# Patient Record
(demographics unavailable — no encounter records)

---

## 2024-10-29 NOTE — HISTORY OF PRESENT ILLNESS
[FreeTextEntry1] : Ms. ROSIE NEWSOME  is a 40 year old  female who has a past medical history significant for Hyperlipidemia, Obesity, Severe HTG who presents to the office for a follow up evaluation. Patient feels generally well today. Denies SOB, chest pain, palpitations, dizziness, and syncope.    Blood work on 8/2024 demonstrated: Triglycerides: 1388 Total Cholesterol: 293 HDL: 27 LDL: unable to calc Non-HDL: 266 ================ ================ 11/2024 Quoc 10 Fenofibrate On Wegovy by other Doctor Saw dietician

## 2024-10-29 NOTE — DISCUSSION/SUMMARY
[FreeTextEntry1] : Hyperlipidemia, Obesity, Severe HTG   - Patient to  - A detailed discussion took place about the importance of CV risk reduction and LDL lowering. Goal < 70. - The patient understands the importance of incorporating moderate aerobic exercise, 4 times/week for 40 minutes to reduce risk of CV disease. - A detailed discussion of lifestyle modification was done today. Patient understands the importance of a heart healthy diet and incorporating veggies/legumes/fruits/whole grains and limiting processed foods, sugars and carbs in their diet. To maintain hydration with water intake throughout the day. Remove/limit sugary beverages from diet - patient understands that stress reduction along with good sleep hygiene will help aid in weight loss - Follow up in 4 weeks   - The patient has a good understanding of the diagnosis, treatment plan and lifestyle modification. she will contact me at the office for any questions with their care or any changes in their health status.   Case discussed with Dr Sergio Larkin

## 2025-03-30 NOTE — ASSESSMENT
[FreeTextEntry1] : follow up  Obesity (BMI 40.94) Weight management, Healthy food choices, and increased physical activity discussed. Zepbound not covered by insurance we'll check labs today referred to bariatric clinic  Hx of anxiety/bipolar affective disorder cont Seroquel 200mg qhs cont Hydroxyzine 25mg/10mg qhs prn following with psychiatry  elevated cholesterol with high triglycerides pt reports stopping her medications restart rosuvastatin 10mg qhs prn - renewed today restart omega 3 capsules, 2 capsules BID - renewed today we'll check lipids and LFT's today  Asthma cont Albuterol inhaler prn  Blood work ordered. Follow up in one week for lab results

## 2025-03-30 NOTE — HISTORY OF PRESENT ILLNESS
[de-identified] :  Ms. ROSIE NEWSOME is a 40 year old female with hx of E is a 40 year old female with Hx of bipolar affective disorder, asthma, anxiety, Hx of cholecystectomy, presenting for a follow up visit.  Pt c/o eating a lot and gaining weight due to Seroqouel. Pt reports Zepbound being rejected by insurance. Pt has seen a nutritionist in the past but says did not work. Denies any SOB, CP, abdominal pain, N/V/D, headache, dizziness, or leg swelling. Pt reports stopping her cholesterol medicine.

## 2025-03-30 NOTE — PHYSICAL EXAM
[No Acute Distress] : no acute distress [Well Nourished] : well nourished [Well Developed] : well developed [Well-Appearing] : well-appearing [Normal Sclera/Conjunctiva] : normal sclera/conjunctiva [Normal Outer Ear/Nose] : the outer ears and nose were normal in appearance [Normal TMs] : both tympanic membranes were normal [No JVD] : no jugular venous distention [No Lymphadenopathy] : no lymphadenopathy [Supple] : supple [No Respiratory Distress] : no respiratory distress  [No Accessory Muscle Use] : no accessory muscle use [Clear to Auscultation] : lungs were clear to auscultation bilaterally [Normal Rate] : normal rate  [Regular Rhythm] : with a regular rhythm [Normal S1, S2] : normal S1 and S2 [No Murmur] : no murmur heard [No Edema] : there was no peripheral edema [No Extremity Clubbing/Cyanosis] : no extremity clubbing/cyanosis [Soft] : abdomen soft [Non Tender] : non-tender [Non-distended] : non-distended [Normal Posterior Cervical Nodes] : no posterior cervical lymphadenopathy [Normal Anterior Cervical Nodes] : no anterior cervical lymphadenopathy [No CVA Tenderness] : no CVA  tenderness [No Spinal Tenderness] : no spinal tenderness [No Joint Swelling] : no joint swelling [Grossly Normal Strength/Tone] : grossly normal strength/tone [No Rash] : no rash [Coordination Grossly Intact] : coordination grossly intact [No Focal Deficits] : no focal deficits [Normal Gait] : normal gait [Normal Affect] : the affect was normal [Normal Insight/Judgement] : insight and judgment were intact [Normal Bowel Sounds] : normal bowel sounds

## 2025-03-30 NOTE — ADDENDUM
[FreeTextEntry1] : Documented by Jase Rolle acting as a scribe for Dr. Ludy Wilkerson. 03/25/2025  All medical records entries made by the scribe were at my, Dr. Wilkerson, direction and personally dictated by me on 03/25/2025. I have reviewed the chart and agree that the record accurately reflects my personal performance of the history, physical exam, assessment and plan. I have also personally directed, reviewed, and agreed with the chart.

## 2025-03-30 NOTE — HISTORY OF PRESENT ILLNESS
[de-identified] :  Ms. ROSIE NEWSOME is a 40 year old female with hx of E is a 40 year old female with Hx of bipolar affective disorder, asthma, anxiety, Hx of cholecystectomy, presenting for a follow up visit.  Pt c/o eating a lot and gaining weight due to Seroqouel. Pt reports Zepbound being rejected by insurance. Pt has seen a nutritionist in the past but says did not work. Denies any SOB, CP, abdominal pain, N/V/D, headache, dizziness, or leg swelling. Pt reports stopping her cholesterol medicine.

## 2025-06-07 NOTE — ADDENDUM
[FreeTextEntry1] : Documented by Jase Rolle acting as a scribe for Dr. Ludy Wilkerson. 06/04/2025  All medical records entries made by the scribe were at my, Dr. Wilkerson, direction and personally dictated by me on 06/04/2025. I have reviewed the chart and agree that the record accurately reflects my personal performance of the history, physical exam, assessment and plan. I have also personally directed, reviewed, and agreed with the chart.

## 2025-06-07 NOTE — ASSESSMENT
[FreeTextEntry1] : follow up  Obesity start monjuaro 2.5mg weekly Weight management, Healthy food choices, and increased physical activity discussed. referred to bariatric clinic  Hx of anxiety/bipolar affective disorder cont Seroquel 200mg qhs cont Hydroxyzine 25mg/10mg qhs prn following with psychiatry  Asthma cont Albuterol inhaler prn  lab results reviewed and discussed with patient  HLD - triglycerides 1167mg/dL, cholesterol 310mg/dL, unable to calculate LDL pt admits to not taking rosuvastatin or fish oil tablets restart rosuvastatin 10mg qhs prn -- renewed today start fenofibrate 145mg daily - Rx sent to pharmacy stressed importance of medication and diet compliance  abnormal CBC - elevated serum ferritin referred to hematology  high risk for DM - A1C 6.4 discussed the importance of following a low carb/low sugar diet

## 2025-06-07 NOTE — HISTORY OF PRESENT ILLNESS
[Home] : at home, [unfilled] , at the time of the visit. [Medical Office: ([facility])___] : at the medical office located in  [Telehealth (audio & video)] : This visit was provided via telehealth using real-time 2-way audio visual technology. [Verbal consent obtained from patient] : the patient, [unfilled] [de-identified] : Ms. ROSIE NEWSOME is a 41 year old female with hx of bipolar affective disorder, asthma, anxiety, cholecystectomy, seen in telemedicine for a follow up visit to discuss lab results   Pt states she is feeling well. Offers no complaints. Denies any SOB, CP, abdominal pain, N/V/D, headache, dizziness, or leg swelling.

## 2025-06-07 NOTE — HISTORY OF PRESENT ILLNESS
[Home] : at home, [unfilled] , at the time of the visit. [Medical Office: ([facility])___] : at the medical office located in  [Telehealth (audio & video)] : This visit was provided via telehealth using real-time 2-way audio visual technology. [Verbal consent obtained from patient] : the patient, [unfilled] [de-identified] : Ms. ROSIE NEWSOME is a 41 year old female with hx of bipolar affective disorder, asthma, anxiety, cholecystectomy, seen in telemedicine for a follow up visit to discuss lab results   Pt states she is feeling well. Offers no complaints. Denies any SOB, CP, abdominal pain, N/V/D, headache, dizziness, or leg swelling.

## 2025-06-30 NOTE — ASSESSMENT
[FreeTextEntry1] : follow up  Obesity increase monjuaro to 5mg weekly - renewed today Weight management, Healthy food choices, and increased physical activity discussed.  Hx of anxiety/bipolar affective disorder cont Seroquel 200mg qhs cont Hydroxyzine 25mg/10mg qhs prn following with psychiatry  Asthma cont Albuterol inhaler prn  high serum ferritin has not followed up with hematology-stressed importance of following up we'll check cbc and iron studies today  HLD cont rosuvastatin 10mg qhs prn cont fenofibrate 145mg daily reinforced the importance of following a low cholesterol/low fat diet we'll check lipids and LFT's today  high risk for DM reinforced the importance of following a low carb/low sugar diet we'll check glucose and HbA1c today   bloodwork ordered follow up in one week for lab results

## 2025-06-30 NOTE — PHYSICAL EXAM
[No Acute Distress] : no acute distress [Well-Appearing] : well-appearing [Normal Sclera/Conjunctiva] : normal sclera/conjunctiva [Normal Outer Ear/Nose] : the outer ears and nose were normal in appearance [Normal TMs] : both tympanic membranes were normal [No JVD] : no jugular venous distention [No Lymphadenopathy] : no lymphadenopathy [Supple] : supple [No Respiratory Distress] : no respiratory distress  [No Accessory Muscle Use] : no accessory muscle use [Clear to Auscultation] : lungs were clear to auscultation bilaterally [Normal Rate] : normal rate  [Regular Rhythm] : with a regular rhythm [Normal S1, S2] : normal S1 and S2 [No Murmur] : no murmur heard [No Edema] : there was no peripheral edema [Soft] : abdomen soft [Non Tender] : non-tender [Non-distended] : non-distended [Normal Bowel Sounds] : normal bowel sounds [Normal Posterior Cervical Nodes] : no posterior cervical lymphadenopathy [Normal Anterior Cervical Nodes] : no anterior cervical lymphadenopathy [No CVA Tenderness] : no CVA  tenderness [No Joint Swelling] : no joint swelling [No Rash] : no rash [Coordination Grossly Intact] : coordination grossly intact [No Focal Deficits] : no focal deficits [Normal Gait] : normal gait [Normal Affect] : the affect was normal [Normal Insight/Judgement] : insight and judgment were intact

## 2025-06-30 NOTE — HISTORY OF PRESENT ILLNESS
[de-identified] : Ms. ROSIE NEWSOME is a 41 year old female with hx of bipolar affective disorder, asthma, anxiety, cholecystectomy, presenting for a follow up.   Pt states she is feeling well. Offers no complaints. Pt was referred to hematology on her previous visit but she has not followed up yet. Denies any SOB, CP, abdominal pain, N/V/D, headache, dizziness, or leg swelling. Pt was started on rosuvastatin on the last visit. Pt has started taking monjuaro 2.5mg weekly.

## 2025-06-30 NOTE — ADDENDUM
[FreeTextEntry1] : Documented by Jase Rolle acting as a scribe for Dr. Ludy Wilkerson. 06/30/2025  All medical records entries made by the scribe were at my, Dr. Wilkerson, direction and personally dictated by me on 06/30/2025. I have reviewed the chart and agree that the record accurately reflects my personal performance of the history, physical exam, assessment and plan. I have also personally directed, reviewed, and agreed with the chart.